# Patient Record
Sex: FEMALE | Race: WHITE | NOT HISPANIC OR LATINO | Employment: FULL TIME | URBAN - METROPOLITAN AREA
[De-identification: names, ages, dates, MRNs, and addresses within clinical notes are randomized per-mention and may not be internally consistent; named-entity substitution may affect disease eponyms.]

---

## 2017-12-01 ENCOUNTER — ALLSCRIPTS OFFICE VISIT (OUTPATIENT)
Dept: OTHER | Facility: OTHER | Age: 37
End: 2017-12-01

## 2017-12-02 ENCOUNTER — HOSPITAL ENCOUNTER (EMERGENCY)
Facility: HOSPITAL | Age: 37
Discharge: HOME | End: 2017-12-02
Attending: EMERGENCY MEDICINE | Admitting: EMERGENCY MEDICINE
Payer: COMMERCIAL

## 2017-12-02 ENCOUNTER — GENERIC CONVERSION - ENCOUNTER (OUTPATIENT)
Dept: OTHER | Facility: OTHER | Age: 37
End: 2017-12-02

## 2017-12-02 VITALS
WEIGHT: 187 LBS | RESPIRATION RATE: 16 BRPM | DIASTOLIC BLOOD PRESSURE: 72 MMHG | HEART RATE: 82 BPM | SYSTOLIC BLOOD PRESSURE: 124 MMHG | TEMPERATURE: 98.3 F | OXYGEN SATURATION: 97 %

## 2017-12-02 DIAGNOSIS — S39.011A STRAIN OF ABDOMINAL MUSCLE, INITIAL ENCOUNTER: Primary | ICD-10-CM

## 2017-12-02 LAB
ALBUMIN SERPL BCP-MCNC: 3.5 G/DL (ref 3.5–5)
ALP SERPL-CCNC: 72 U/L (ref 46–116)
ALT SERPL W P-5'-P-CCNC: 21 U/L (ref 12–78)
ANION GAP SERPL CALCULATED.3IONS-SCNC: 8 MMOL/L (ref 4–13)
AST SERPL W P-5'-P-CCNC: 18 U/L (ref 5–45)
BACTERIA UR QL AUTO: ABNORMAL /HPF
BASOPHILS # BLD AUTO: 0.03 THOUSANDS/ΜL (ref 0–0.1)
BASOPHILS NFR BLD AUTO: 0 % (ref 0–1)
BILIRUB SERPL-MCNC: 0.38 MG/DL (ref 0.2–1)
BILIRUB UR QL STRIP: NEGATIVE
BUN SERPL-MCNC: 11 MG/DL (ref 5–25)
CALCIUM SERPL-MCNC: 8.8 MG/DL (ref 8.3–10.1)
CHLORIDE SERPL-SCNC: 105 MMOL/L (ref 100–108)
CLARITY UR: CLEAR
CLARITY, POC: YELLOW
CO2 SERPL-SCNC: 28 MMOL/L (ref 21–32)
COLOR UR: YELLOW
COLOR, POC: CLEAR
CREAT SERPL-MCNC: 0.9 MG/DL (ref 0.6–1.3)
EOSINOPHIL # BLD AUTO: 0.18 THOUSAND/ΜL (ref 0–0.61)
EOSINOPHIL NFR BLD AUTO: 2 % (ref 0–6)
ERYTHROCYTE [DISTWIDTH] IN BLOOD BY AUTOMATED COUNT: 12.3 % (ref 11.6–15.1)
EXT PREG TEST URINE: NEGATIVE
GFR SERPL CREATININE-BSD FRML MDRD: 81 ML/MIN/1.73SQ M
GLUCOSE SERPL-MCNC: 87 MG/DL (ref 65–140)
GLUCOSE UR STRIP-MCNC: NEGATIVE MG/DL
HCT VFR BLD AUTO: 40.1 % (ref 34.8–46.1)
HGB BLD-MCNC: 13.8 G/DL (ref 11.5–15.4)
HGB UR QL STRIP.AUTO: ABNORMAL
KETONES UR STRIP-MCNC: NEGATIVE MG/DL
LEUKOCYTE ESTERASE UR QL STRIP: NEGATIVE
LIPASE SERPL-CCNC: 99 U/L (ref 73–393)
LYMPHOCYTES # BLD AUTO: 2.22 THOUSANDS/ΜL (ref 0.6–4.47)
LYMPHOCYTES NFR BLD AUTO: 24 % (ref 14–44)
MCH RBC QN AUTO: 31.1 PG (ref 26.8–34.3)
MCHC RBC AUTO-ENTMCNC: 34.4 G/DL (ref 31.4–37.4)
MCV RBC AUTO: 90 FL (ref 82–98)
MONOCYTES # BLD AUTO: 0.77 THOUSAND/ΜL (ref 0.17–1.22)
MONOCYTES NFR BLD AUTO: 8 % (ref 4–12)
NEUTROPHILS # BLD AUTO: 6.07 THOUSANDS/ΜL (ref 1.85–7.62)
NEUTS SEG NFR BLD AUTO: 66 % (ref 43–75)
NITRITE UR QL STRIP: NEGATIVE
NON-SQ EPI CELLS URNS QL MICRO: ABNORMAL /HPF
NRBC BLD AUTO-RTO: 0 /100 WBCS
PH UR STRIP.AUTO: 6 [PH] (ref 4.5–8)
PLATELET # BLD AUTO: 244 THOUSANDS/UL (ref 149–390)
PMV BLD AUTO: 10.6 FL (ref 8.9–12.7)
POTASSIUM SERPL-SCNC: 4.4 MMOL/L (ref 3.5–5.3)
PROT SERPL-MCNC: 7.1 G/DL (ref 6.4–8.2)
PROT UR STRIP-MCNC: ABNORMAL MG/DL
RBC # BLD AUTO: 4.44 MILLION/UL (ref 3.81–5.12)
RBC #/AREA URNS AUTO: ABNORMAL /HPF
SODIUM SERPL-SCNC: 141 MMOL/L (ref 136–145)
SP GR UR STRIP.AUTO: 1.02 (ref 1–1.03)
UROBILINOGEN UR QL STRIP.AUTO: 0.2 E.U./DL
WBC # BLD AUTO: 9.27 THOUSAND/UL (ref 4.31–10.16)
WBC #/AREA URNS AUTO: ABNORMAL /HPF

## 2017-12-02 PROCEDURE — 81001 URINALYSIS AUTO W/SCOPE: CPT

## 2017-12-02 PROCEDURE — 80053 COMPREHEN METABOLIC PANEL: CPT | Performed by: EMERGENCY MEDICINE

## 2017-12-02 PROCEDURE — 81002 URINALYSIS NONAUTO W/O SCOPE: CPT | Performed by: EMERGENCY MEDICINE

## 2017-12-02 PROCEDURE — 96372 THER/PROPH/DIAG INJ SC/IM: CPT

## 2017-12-02 PROCEDURE — 36415 COLL VENOUS BLD VENIPUNCTURE: CPT | Performed by: EMERGENCY MEDICINE

## 2017-12-02 PROCEDURE — 83690 ASSAY OF LIPASE: CPT | Performed by: EMERGENCY MEDICINE

## 2017-12-02 PROCEDURE — 85025 COMPLETE CBC W/AUTO DIFF WBC: CPT | Performed by: EMERGENCY MEDICINE

## 2017-12-02 PROCEDURE — 81025 URINE PREGNANCY TEST: CPT | Performed by: EMERGENCY MEDICINE

## 2017-12-02 PROCEDURE — 99284 EMERGENCY DEPT VISIT MOD MDM: CPT

## 2017-12-02 RX ORDER — AZITHROMYCIN 1 G
1 PACKET (EA) ORAL ONCE
COMMUNITY
End: 2018-02-26

## 2017-12-02 RX ORDER — KETOROLAC TROMETHAMINE 30 MG/ML
15 INJECTION, SOLUTION INTRAMUSCULAR; INTRAVENOUS ONCE
Status: COMPLETED | OUTPATIENT
Start: 2017-12-02 | End: 2017-12-02

## 2017-12-02 RX ADMIN — KETOROLAC TROMETHAMINE 15 MG: 30 INJECTION, SOLUTION INTRAMUSCULAR at 17:10

## 2017-12-02 NOTE — ED PROVIDER NOTES
History  Chief Complaint   Patient presents with    Abdominal Pain     Mid abdominal pain since this morning  Denies n/v/d/c, fever  History of umnilical hernia  11 months postpartum  HPI  18-year-old female who is about 11 months postpartum presenting for evaluation of abdominal pain  Patient says she woke up this morning and then she noted that she had some mid abdominal pain  She says that is a sharp pain which is intermittent, worse whenever she is turning certain way or sitting up  She denies any radiation of the pain, no alleviating factors except for when she is sitting very still  Patient says if she lays down or sits still, she has no pain  Patient denies any back pain  No nausea, no vomiting, no diarrhea, no dysuria, no vaginal discharge  Patient is currently on her menses started yesterday, does have some vaginal bleeding because of this  No prior history of abdominal pain similar to this  Patient did not have a , she had spontaneous vaginal delivery  She denies any fevers and chills  No medical problems, she does have a history of umbilical hernia that was repaired in   Last bowel movement was yesterday night, it was normal   Patient is eating and drinking just fine, no history of reflux disease  Patient does state that over the past week, she does have an upper respiratory infection with a cough  She denies smoking, drinking and drug use  Prior to Admission Medications   Prescriptions Last Dose Informant Patient Reported? Taking? azithromycin (ZITHROMAX) 1 g powder   Yes Yes   Sig: Take 1 packet by mouth once Started azithromycin pack yesterday  Facility-Administered Medications: None       History reviewed  No pertinent past medical history  Past Surgical History:   Procedure Laterality Date    CHOLECYSTECTOMY         History reviewed  No pertinent family history  I have reviewed and agree with the history as documented      Social History   Substance Use Topics    Smoking status: Never Smoker    Smokeless tobacco: Never Used    Alcohol use No        Review of Systems     Constitutional: Negative for appetite change, chills and fever  HENT: Negative for congestion, rhinorrhea and sore throat  Eyes: Negative for photophobia, pain and visual disturbance  Respiratory: Negative for cough, chest tightness and shortness of breath  Cardiovascular: Negative for chest pain, palpitations and leg swelling  Gastrointestinal:  Positive for abdominal pain, negative for diarrhea, nausea and vomiting  Genitourinary: Negative for dysuria, flank pain and hematuria  Musculoskeletal: Negative for back pain, neck pain and neck stiffness  Skin: Negative for color change, rash and wound  Neurological: Negative for dizziness, numbness and headaches  All other systems reviewed and are negative        Physical Exam  ED Triage Vitals [12/02/17 1550]   Temperature Pulse Respirations Blood Pressure SpO2   98 3 °F (36 8 °C) 85 16 135/86 97 %      Temp Source Heart Rate Source Patient Position - Orthostatic VS BP Location FiO2 (%)   Temporal Monitor Sitting Right arm --      Pain Score       6           Orthostatic Vital Signs  Vitals:    12/02/17 1550 12/02/17 1749   BP: 135/86 124/72   Pulse: 85 82   Patient Position - Orthostatic VS: Sitting Sitting       Physical Exam  /72   Pulse 82   Temp 98 3 °F (36 8 °C) (Temporal)   Resp 16   Wt 84 8 kg (187 lb)   LMP 12/01/2017   SpO2 97%     General Appearance:  Alert, cooperative, no distress, appears stated age   Head:  Normocephalic, without obvious abnormality, atraumatic   Eyes:  PERRL, conjunctiva/corneas clear, EOM's intact       Nose: Nares normal, septum midline,mucosa normal, no drainage or sinus tenderness   Throat: Lips, mucosa, and tongue normal; teeth and gums normal   Neck: Supple, symmetrical, trachea midline, no adenopathy   Back:   Symmetric, no curvature, ROM normal, no CVA tenderness   Lungs: Clear to auscultation bilaterally, respirations unlabored   Heart:  Regular rate and rhythm, S1 and S2 normal, no murmur, rub, or gallop   Abdomen:   Soft, there is the moderate tenderness in the mid abdomen along the rectus muscle, no guarding, no right or left upper quadrant tenderness no right or left lower quadrant tenderness, bowel sounds active all four quadrants   Pelvic: Deferred   Extremities: Extremities normal, atraumatic, no cyanosis or edema   Pulses: 2+ and symmetric   Skin: Skin color, texture, turgor normal, no rashes or lesions   Neurologic:      Psychiatric: Moves all extremities, sensation and strength in tact in all extremities    Normal mood and affect           ED Medications  Medications   ketorolac (TORADOL) 30 mg/mL injection 15 mg (15 mg Intramuscular Given 12/2/17 1710)       Diagnostic Studies  Results Reviewed     Procedure Component Value Units Date/Time    Comprehensive metabolic panel [[id number]] Collected:  12/02/17 1711    Lab Status:  Final result Specimen:  Blood from Arm, Right Updated:  12/02/17 1735     Sodium 141 mmol/L      Potassium 4 4 mmol/L      Chloride 105 mmol/L      CO2 28 mmol/L      Anion Gap 8 mmol/L      BUN 11 mg/dL      Creatinine 0 90 mg/dL      Glucose 87 mg/dL      Calcium 8 8 mg/dL      AST 18 U/L      ALT 21 U/L      Alkaline Phosphatase 72 U/L      Total Protein 7 1 g/dL      Albumin 3 5 g/dL      Total Bilirubin 0 38 mg/dL      eGFR 81 ml/min/1 73sq m     Narrative:         National Kidney Disease Education Program recommendations are as follows:  GFR calculation is accurate only with a steady state creatinine  Chronic Kidney disease less than 60 ml/min/1 73 sq  meters  Kidney failure less than 15 ml/min/1 73 sq  meters      Lipase [[id number]]  (Normal) Collected:  12/02/17 1711    Lab Status:  Final result Specimen:  Blood from Arm, Right Updated:  12/02/17 1735     Lipase 99 u/L     CBC and differential [[id number]]  (Normal) Collected:  12/02/17 1711    Lab Status:  Final result Specimen:  Blood from Arm, Right Updated:  12/02/17 1721     WBC 9 27 Thousand/uL      RBC 4 44 Million/uL      Hemoglobin 13 8 g/dL      Hematocrit 40 1 %      MCV 90 fL      MCH 31 1 pg      MCHC 34 4 g/dL      RDW 12 3 %      MPV 10 6 fL      Platelets 555 Thousands/uL      nRBC 0 /100 WBCs      Neutrophils Relative 66 %      Lymphocytes Relative 24 %      Monocytes Relative 8 %      Eosinophils Relative 2 %      Basophils Relative 0 %      Neutrophils Absolute 6 07 Thousands/µL      Lymphocytes Absolute 2 22 Thousands/µL      Monocytes Absolute 0 77 Thousand/µL      Eosinophils Absolute 0 18 Thousand/µL      Basophils Absolute 0 03 Thousands/µL     POCT pregnancy, urine [[id number]]  (Normal) Resulted:  12/02/17 1707    Lab Status:  Final result Updated:  12/02/17 1707     EXT PREG TEST UR (Ref: Negative) negative    Urine Microscopic [[id number]]  (Abnormal) Collected:  12/02/17 1622    Lab Status:  Final result Specimen:  Urine from Urine, Clean Catch Updated:  12/02/17 1643     RBC, UA Innumerable (A) /hpf      WBC, UA None Seen /hpf      Epithelial Cells Occasional /hpf      Bacteria, UA Occasional /hpf     POCT urinalysis dipstick [[id number]]  (Normal) Resulted:  12/02/17 1611    Lab Status:  Final result Updated:  12/02/17 1612     Color, UA clear     Clarity, UA yellow    ED Urine Macroscopic [[id number]]  (Abnormal) Collected:  12/02/17 1622    Lab Status:  Final result Specimen:  Urine Updated:  12/02/17 1606     Color, UA Yellow     Clarity, UA Clear     pH, UA 6 0     Leukocytes, UA Negative     Nitrite, UA Negative     Protein, UA 30 (1+) (A) mg/dl      Glucose, UA Negative mg/dl      Ketones, UA Negative mg/dl      Urobilinogen, UA 0 2 E U /dl      Bilirubin, UA Negative     Blood, UA Large (A)     Specific [place], UA 1 020    Narrative:       CLINITEK RESULT                 No orders to display         Procedures  Procedures      Phone Consults  ED Phone Contact    ED Course  ED Course as of Dec 03 2041   Sat Dec 02, 2017   1746 Patient feels about the same, appears comfortable in the bed  Labs and urine unremarkable  Advised patient to return to the emergency room if pain worsens in the next 12-24 hours  Advised taking Motrin/Tylenol scheduled until the pain resolves  Follow up with her prior surgeon Dr Mehreen Ferrell as well as her PCP  MDM   Patient abdominal pain likely related to a muscular strain of the abdominus rectus  We will get the abdominal labs to evaluate for pancreatitis, liver etiology  Will check a UA for UTI  The will treat with Toradol  Reassess  CritCare Time    Disposition  Final diagnoses:   Strain of abdominal muscle, initial encounter     Time reflects when diagnosis was documented in both MDM as applicable and the Disposition within this note     Time User Action Codes Description Comment    12/2/2017  5:48 PM Oly Cantu Add [S39 011A] Strain of abdominal muscle, initial encounter       ED Disposition     ED Disposition Condition Comment    Discharge  Madi Youngbloodpk discharge to home/self care  Condition at discharge: Stable        Follow-up Information     Follow up With Specialties Details Why [facility], DO Family Medicine Schedule an appointment as soon as possible for a visit in 3 days  Alta Cox [address] [place] [address]  [phone]      Your surgeon, Dr Summer Solo an appointment as soon as possible for a visit in 3 days          Discharge Medication List as of 12/2/2017  5:50 PM      CONTINUE these medications which have NOT CHANGED    Details   azithromycin (ZITHROMAX) 1 g powder Take 1 packet by mouth once Started azithromycin pack yesterday , Historical Med           No discharge procedures on file  ED Provider  Attending physically available and evaluated Madi Hubbard I managed the patient along with the ED Attending      Electronically Signed by         Tatiana Gracia MD  Resident  12/03/17 2041

## 2017-12-02 NOTE — ED ATTENDING ATTESTATION
IClancy DO Kailey, saw and evaluated the patient  I have discussed the patient with the resident/non-physician practitioner and agree with the resident's/non-physician practitioner's findings, Plan of Care, and MDM as documented in the resident's/non-physician practitioner's note, except where noted  All available labs and Radiology studies were reviewed  At this point I agree with the current assessment done in the Emergency Department  I have conducted an independent evaluation of this patient a history and physical is as follows:      Critical Care Time  Unknown [place] female presents to the emergency department with a 1 day history of soreness around the periumbilical region at the site of her previous umbilical hernia repair 2 years ago  She states the pain is worse with movement and better if she lays down  She has not noticed any swelling of the area  No fevers or chills, nausea, vomiting or diarrhea  She states that she has been coughing a lot secondary to an upper respiratory illness  On exam she is awake and alert and in no distress  Heart is regular without murmur  Lungs are clear  Abdomen is obese, soft with tenderness around the periumbilical region with no palpable hernia  No rebound or guarding  Bowel sounds are normal  Skin is warm and dry, normal color no rash

## 2017-12-02 NOTE — DISCHARGE INSTRUCTIONS
Muscle Strain   WHAT YOU NEED TO KNOW:   What is a muscle strain? A muscle strain is a twist, pull, or tear of a muscle or tendon  A tendon is a strong elastic tissue that connects a muscle to a bone  What causes a muscle strain? Stretching a muscle too much may cause a muscle strain  A strain may also happen when a muscle is used too much without rest  Leg muscle strains are more common in people who play sports, run, dance, and water-ski  Strains in the muscles of the abdomen may happen when you play volleyball, tennis, golf, or baseball and when you dive  Low back muscle strains may occur when you lift heavy objects or when you wrestle or do gymnastics  What are the types of muscle strains? · A mild strain  is also called a first-degree strain  It is a tear of a few muscle fibers with little swelling  You may have very little or no loss of muscle strength  · A moderate strain  is also called a second-degree strain  There is more damage to your muscle or tendon, and it is weaker than it was before the injury  · A severe strain  is also called a third-degree strain  This tear goes along the whole length of the muscle, and you are unable to use the muscle at all  What increases my risk of a muscle strain? · Older age    · Muscle fatigue (tiredness)    · Not warming up before exercise    · Past muscle injury, or going back to your usual activity before your injury has healed    · Stiff, tight, and weak muscles    · Training longer or farther than your usual time or distance     · Problems with your feet, or your legs being different lengths  What are the signs and symptoms of a muscle strain? The signs and symptoms of a muscle strain depend on how badly your muscle is injured  The signs and symptoms may or may not show up right away when the injury happens   You may have one or more of the following:  · Bruised skin on the area of your injured muscle    · Muscle soreness, cramps, or spasms    · Little or stiff muscle movement, or loss of muscle strength    · Swelling in the area of the injury    · Muscle pain that gets worse with activity, or pain that moves or spreads to another body area    · Crepitus (crackling sound or grating feeling) when you move your muscle  How is a muscle strain diagnosed? Your healthcare provider will ask about diseases or injuries you have had in the past  He may touch and press parts of your muscle  He may bend, stretch, or move your joint certain ways  You may also have any of the following tests:  · X-ray: This is a picture of the bones and tissues in your body  X-rays may be done to make sure that you did not break a bone when your muscle strain happened  · Magnetic resonance imaging: This test is also called MRI  During the MRI, pictures of your muscles are taken  An MRI may be used to check for tears or other muscle injuries  It may also be used to look at your joints, bones, or blood vessels  You may be given dye through your vein before the pictures are taken  This helps your body parts show up better  Tell your healthcare provider if you are allergic to shellfish (lobster, crab, or shrimp)  People who are allergic to shellfish may also be allergic to some dyes  · Computed tomography scan: This is also called CT scan  A x-ray machine uses a computer to take pictures of your arms, legs, back, or abdomen  It is used to check for muscle injuries, broken bones, and damaged blood vessels  · Ultrasound: An ultrasound uses sound waves to show pictures of your muscles and tissues on a monitor  What can I do to help a muscle strain heal?   · 3 to 7 days after the injury:  Use Rest, Ice, Compression, and Elevation (RICE) to help stop bruising and decrease pain and swelling  ¨ Rest:  Rest your muscle to allow your injury to heal  When the pain decreases, begin normal, slow movements   For mild and moderate muscle strains, you should rest your muscles for about 2 days  However, if you have a severe muscle strain, you should rest for 10 to 14 days  You may need to use crutches to walk if your muscle strain is in your legs or lower body  ¨ Ice:  Put an ice pack on the injured area  Put a towel between the ice pack and your skin  Do not put the ice pack directly on your skin  You can use a package of frozen peas instead of an ice pack  ¨ Compression:  You may need to wrap an elastic bandage around the area to decrease swelling  It should be tight enough for you to feel support  Do not wrap it too tightly  ¨ Elevation:  Keep the injured muscle raised above your heart if possible  For example, if you have a strain of your lower leg muscle, lie down and prop your leg up on pillows  This helps decrease pain and swelling  · 3 to 21 days after your injury:  Start to slowly and regularly exercise your strained muscle  This will help it heal  If you feel pain, decrease how hard you are exercising  · 1 to 6 weeks after your injury:  Stretch the injured muscle  Stretch the muscle for about 30 seconds  Do this 4 times a day  You may stretch the muscle until you feel a slight pull  Stop stretching if you feel pain  · 2 weeks to 6 months after your injury:  The goal of this phase is to return to the activity you were doing before the injury without hurting the muscle again  · 3 weeks to 6 months after your injury:  Keep stretching and strengthening your muscles to avoid injury  Slowly increase the time and distance that you exercise  You may still have signs and symptoms of muscle strain 6 months after the injury, even if you do things to help it heal  In this case, you may need surgery on the muscle  How is a muscle strain treated? · Medicines:      ¨ NSAIDs:  This medicine is used to decrease pain and inflammation  It can be bought without a doctor's order  NSAIDs can cause stomach bleeding or kidney problems if they are not taken correctly   Always read the medicine label and follow the directions on it before you use this medicine  You should only use this medicine for 3 to 7 days after the injury happened  ¨ Muscle relaxers  help decrease pain and muscle spasms  ¨ Steroid medicines: Your healthcare provider may recommend a steroid injection to decrease pain and inflammation  ¨ Local anesthetic:  This can be used to numb the are for a short time  This is often used if you have a muscle strain in your back  · Physical therapy:  A physical therapist teaches you exercises to help improve movement and strength, and to decrease pain  · Surgery:  healthcare providers may recommend surgery if your muscle strain does not heal after 6 months of treatments  Surgery may be done to drain blood that has pooled in your muscle  If your tendon was torn off of the bone, it may be put back with surgery  How can a muscle strain be prevented? · Always wear proper shoes when you play sports:  Replace your old running shoes with new ones often if you are a runner  Use special shoe inserts or arch supports to correct leg or foot problems  Ask your healthcare provider for more information on shoe supports  · Do warm up and cool down exercises:  Do stretching exercises before you work out or do sports activities  These exercises will help loosen and decrease stress on your muscles  Cool down and stretch after your workout  Do not stop and rest after a workout without cooling down first            · Keep your muscles strong with strength training exercises:  Exercises such as weight lifting and stretching exercises help keep your muscles flexible and strong  A physical therapist or  may help you with these exercises  · Slowly start your exercise or sports training program:  Follow your healthcare provider's advice on when to start exercising  Slowly increase time, distance, and how often you train   Sudden increases in how often you train may cause you to injure your muscle again  When should I call my healthcare provider? · Your pain and swelling worsen or do not go away  · You have questions or concerns about your care or treatment  When should I seek immediate care? · You suddenly cannot feel or move your injured muscle  CARE AGREEMENT:   You have the right to help plan your care  Learn about your health condition and how it may be treated  Discuss treatment options with your caregivers to decide what care you want to receive  You always have the right to refuse treatment  The above information is an  only  It is not intended as medical advice for individual conditions or treatments  Talk to your doctor, nurse or pharmacist before following any medical regimen to see if it is safe and effective for you  © 2017 [employer]  Information is for End User's use only and may not be sold, redistributed or otherwise used for commercial purposes  All illustrations and images included in CareNotes® are the copyrighted property of [employer]  or [employer]

## 2017-12-05 ENCOUNTER — GENERIC CONVERSION - ENCOUNTER (OUTPATIENT)
Dept: OTHER | Facility: OTHER | Age: 37
End: 2017-12-05

## 2017-12-05 NOTE — PROGRESS NOTES
Assessment    1  Acute sinusitis, recurrence not specified, unspecified location (461 9) (J01 90)   2  Otitis media (382 9) (H66 90)   3  Cough (786 2) (R05)   4  Eustachian tube dysfunction (381 81) (H69 80)   5  Breast feeding status of mother (V24 1) (Z39 1)   6  Allergic rhinitis (477 9) (J30 9)    Plan  Acute sinusitis, recurrence not specified, unspecified location, Allergic rhinitis, Breastfeeding status of mother, Cough, Eustachian tube dysfunction, Otitis media    · Drink plenty of fluids ; Status:Complete;   Done: 46XSE8519 11:11AM   · Gargle with warm salt water for 5 minutes every 4 hours ; Status:Complete;   Done:51Fwq0144 11:11AM   · Follow-up PRN Evaluation and Treatment  Follow-up  Status: Complete  Done:01Dec2017 11:11AM  Acute sinusitis, recurrence not specified, unspecified location, Otitis media    · DrRx Zithromax Z-Abdoulaye 250 MG #6 pill pack; Sig   2 stat then one daily  Allergic rhinitis, Eustachian tube dysfunction    · Rhinocort Allergy 32 MCG/ACT Nasal Suspension; instill 1 spray into each nostrilonce daily    Discussion/Summary    1  Allergic rhinitis 2  Eustachian tube dysfunction, Rhinocort was ordered, category B for pregnancy so safe in breast-feedingAcute sinusitis 4  Otitis media, Zithromax was ordered category B for pregnancy, safe for breast-feedingCough  Patient use over-the-counter Robitussin, safe for breast-feeding increased p o  fluidsBreast feeding infant, 6month-oldPatient to return in 1 week if still symptoms  Possible side effects of new medications were reviewed with the patient/guardian today  The treatment plan was reviewed with the patient/guardian  The patient/guardian understands and agrees with the treatment plan     Self Referrals: No      Chief Complaint  nasal and chest congestion since 11-23-17 productive cough with thick greenish mucus   no fever      History of Present Illness  HPI: The past week patient has had increasing sinus pain and pressure otalgia sneezing for the rhinorrhea postnasal drip scratchy throat mild mildly harsh productive cough with yellow-green sputum  No fever chills night sweats patient denies any chest patient's with wheeze or hemoptysis no medication has been taken patient is breastfeeding      Review of Systems   Constitutional: as noted in HPI   ENT: as noted in HPI  Cardiovascular: no complaints of slow or fast heart rate, no chest pain, no palpitations, no leg claudication or lower extremity edema  Respiratory: as noted in HPI  Breasts: no complaints of breast pain, breast lump or nipple discharge  Gastrointestinal: no complaints of abdominal pain, no constipation, no nausea or diarrhea, no vomiting, no bloody stools  Genitourinary: no complaints of dysuria, no incontinence, no pelvic pain, no dysmenorrhea, no vaginal discharge or abnormal vaginal bleeding  Musculoskeletal: no complaints of arthralgia, no myalgia, no joint swelling or stiffness, no limb pain or swelling  Integumentary: no complaints of skin rash or lesion, no itching or dry skin, no skin wounds  Neurological: no complaints of headache, no confusion, no numbness or tingling, no dizziness or fainting  Active Problems  1  Allergic rhinitis (477 9) (J30 9)    Past Medical History  1  History of 14 weeks gestation of pregnancy (V22 2) (Z3A 14)   2  History of Biliary dyskinesia (575 8) (K82 8)   3  History of Cellulitis (682 9) (L03 90)   4  History of Gallbladder disease (575 9) (K82 9)   5  History of sinusitis (V12 69) (Z87 09)  Active Problems And Past Medical History Reviewed: The active problems and past medical history were reviewed and updated today  Family History  Mother    1  Family history of Arthritis (V17 7)   2  Family history of Diabetes Mellitus (V18 0)  Father    3  Family history of Acute Myocardial Infarction (V17 3)   4  Family history of Alcoholism   5  Family history of Coronary Artery Disease (V17 49)   6   Family history of Heart Disease (V17 49)  Family History Reviewed: The family history was reviewed and updated today  Social History   · Being A Social Drinker   · Employed   · Marital History - Currently    · Never A Smoker  The social history was reviewed and updated today  Surgical History    1  History of Cholecystectomy Laparoscopic   2  History of Oral Surgery Tooth Extraction  Surgical History Reviewed: The surgical history was reviewed and updated today  Current Meds   1  Azithromycin 250 MG Oral Tablet; TAKE 2 TABLETS ON DAY 1 THEN TAKE 1 TABLET A DAY FOR 4 DAYS; Therapy: [id number] to (Last Rx:22Oct2016) Ordered   2  Prenatal Vitamin TABS; Therapy: ([facility]) to Recorded    The medication list was reviewed and updated today  Allergies  1  Penicillins    Vitals   Recorded: 21HKL3856 10:59AM   Temperature 98 2 F   Heart Rate 72   Respiration 16   Systolic 189   Diastolic 70   Height 5 ft 5 in   Weight 187 lb 6 08 oz   BMI Calculated 31 18   BSA Calculated 1 93       Physical Exam   Constitutional  General appearance: No acute distress, well appearing and well nourished  Eyes  Conjunctiva and lids: No swelling, erythema or discharge  Ears, Nose, Mouth, and Throat  External inspection of ears and nose: Normal    Otoscopic examination: Abnormal  -- Bilateral tympanic membranes red and dull  Nasal mucosa, septum, and turbinates: Abnormal  -- Positive allergic turbinates positive pansinus tenderness to percussion  Oropharynx: Abnormal  -- Purulent postnasal drip minor injection negative exudate  Pulmonary  Respiratory effort: No increased work of breathing or signs of respiratory distress  Auscultation of lungs: Clear to auscultation  Cardiovascular  Palpation of heart: Normal PMI, no thrills  Auscultation of heart: Normal rate and rhythm, normal S1 and S2, without murmurs  Lymphatic  Palpation of lymph nodes in neck: Abnormal  -- Positive shotty anterior nodes    Musculoskeletal Gait and station: Normal    Skin Warm and dry  Neurologic Negative Gross focal motor deficit    Psychiatric  Mood and affect: Normal          Signatures   Electronically signed by : Evelio Robison DO; Dec  1 2017 11:14AM EST                       (Author)

## 2017-12-11 DIAGNOSIS — R31.29 OTHER MICROSCOPIC HEMATURIA: ICD-10-CM

## 2017-12-31 ENCOUNTER — OFFICE VISIT (OUTPATIENT)
Dept: URGENT CARE | Facility: MEDICAL CENTER | Age: 37
End: 2017-12-31
Payer: COMMERCIAL

## 2017-12-31 PROCEDURE — 99212 OFFICE O/P EST SF 10 MIN: CPT

## 2018-01-04 NOTE — PROGRESS NOTES
Assessment   1  Acute right otitis media (382 9) (H66 91)    Plan   Acute right otitis media    · Azithromycin 250 MG Oral Tablet (Zithromax Z-Abdoulaye); TAKE 2 TABLETS ON DAY 1    THEN TAKE 1 TABLET A DAY FOR 4 DAYS   · Bromfed DM 30-2-10 MG/5ML Oral Syrup; TAKE 5 ML EVERY 4 TO 6 HOURS AS    NEEDED    Discussion/Summary   Discussion Summary:    Increase fluids, finish all anabiotic  Follow-up if symptoms increase or no better in 5 days  Medication Side Effects Reviewed: Possible side effects of new medications were reviewed with the patient/guardian today  Understands and agrees with treatment plan: The treatment plan was reviewed with the patient/guardian  The patient/guardian understands and agrees with the treatment plan    Counseling Documentation With Imm: The patient was counseled regarding instructions for management,-- prognosis,-- patient and family education,-- impressions  Chief Complaint   1  Cold Symptoms  Chief Complaint Free Text Note Form: Pt with complaints of nasal congestion, sinus pressure and ear pain since 12/23  History of Present Illness   HPI: Patient with two-day history of increasing sinus and right ear discomfort  Hospital Based Practices Required Assessment:      Pain Assessment      the patient states they do not have pain  (on a scale of 0 to 10, the patient rates the pain at 0 )     Cold Symptoms: Eleanor Najjar presents with complaints of cold symptoms  Associated symptoms include nasal congestion,-- runny nose,-- post nasal drainage,-- scratchy throat,-- dry cough,-- facial pressure,-- facial pain,-- ear pain,-- fatigue,-- fever-- and-- chills  Review of Systems   ROS Reviewed:    ROS reviewed  Active Problems   1  Abdominal pain (789 00) (R10 9)   2  Acute sinusitis, recurrence not specified, unspecified location (461 9) (J01 90)   3  Allergic rhinitis (477 9) (J30 9)   4  Breast feeding status of mother (V24 1) (Z39 1)   5  Cough (786 2) (R05)   6  Eustachian tube dysfunction (381 81) (H69 80)   7  Hematuria, microscopic (599 72) (R31 29)   8  Otitis media (382 9) (H66 90)    Past Medical History   1  History of 14 weeks gestation of pregnancy (V22 2) (Z3A 14)   2  History of Biliary dyskinesia (575 8) (K82 8)   3  History of Cellulitis (682 9) (L03 90)   4  History of Gallbladder disease (575 9) (K82 9)   5  History of sinusitis (V12 69) (Z87 09)    Family History   Mother    1  Family history of Arthritis (V17 7)   2  Family history of Diabetes Mellitus (V18 0)  Father    3  Family history of Acute Myocardial Infarction (V17 3)   4  Family history of Alcoholism   5  Family history of Coronary Artery Disease (V17 49)   6  Family history of Heart Disease (V17 49)    Social History    · Being A Social Drinker   · Employed   · Marital History - Currently    · Never A Smoker    Surgical History   1  History of Cholecystectomy Laparoscopic   2  History of Oral Surgery Tooth Extraction    Current Meds    1  No Reported Medications Recorded    Allergies   1  Penicillins    Vitals   Signs   Recorded: 21Eqh1058 04:00PM   Temperature: 98 7 F  Heart Rate: 80  Respiration: 16  Systolic: 920  Diastolic: 70  O2 Saturation: 99  Pain Scale: 0    Physical Exam        Constitutional      General appearance: No acute distress, well appearing and well nourished  Ears, Nose, Mouth, and Throat      External inspection of ears and nose: Normal        Otoscopic examination: Abnormal   The right tympanic membrane was red-- and-- was bulging  The left tympanic membrane was bulging  The right external canal was normal  The left external canal was normal       Nasal mucosa, septum, and turbinates: Normal without edema or erythema  Oropharynx: Normal with no erythema, edema, exudate or lesions  Pulmonary      Respiratory effort: No increased work of breathing or signs of respiratory distress  Auscultation of lungs: Clear to auscultation  Cardiovascular      Auscultation of heart: Normal rate and rhythm, normal S1 and S2, without murmurs  Lymphatic      Palpation of lymph nodes in neck: No lymphadenopathy         Signatures    Electronically signed by : Ninfa Cabrera, [facility]; Dec 31 2017  4:20PM EST                       (Author)     Electronically signed by : SHUN Degroot ; Danny  3 2018  9:19AM EST                       (Co-author)

## 2018-01-23 VITALS
TEMPERATURE: 98.2 F | HEART RATE: 72 BPM | SYSTOLIC BLOOD PRESSURE: 110 MMHG | WEIGHT: 187.38 LBS | DIASTOLIC BLOOD PRESSURE: 70 MMHG | HEIGHT: 65 IN | BODY MASS INDEX: 31.22 KG/M2 | RESPIRATION RATE: 16 BRPM

## 2018-01-23 VITALS
SYSTOLIC BLOOD PRESSURE: 122 MMHG | OXYGEN SATURATION: 99 % | HEART RATE: 80 BPM | TEMPERATURE: 98.7 F | DIASTOLIC BLOOD PRESSURE: 70 MMHG | RESPIRATION RATE: 16 BRPM

## 2018-01-23 NOTE — MISCELLANEOUS
Message  Message Free Text Note Form: pt called with severe abd pain   referred to [facility] er for eval      Plan    1  *1 -  EMERGENCY DEPT [facility]  *  Status: Hold For -   Scheduling  Requested for: [id number]  Care Summary provided  : Yes    Signatures   Electronically signed by : Wing Saurabh DO; Dec  2 2017 11:22PM EST                       (Author)

## 2018-01-24 VITALS
WEIGHT: 187.6 LBS | BODY MASS INDEX: 31.25 KG/M2 | SYSTOLIC BLOOD PRESSURE: 120 MMHG | HEIGHT: 65 IN | HEART RATE: 68 BPM | DIASTOLIC BLOOD PRESSURE: 80 MMHG

## 2018-02-26 ENCOUNTER — OFFICE VISIT (OUTPATIENT)
Dept: FAMILY MEDICINE CLINIC | Facility: CLINIC | Age: 38
End: 2018-02-26
Payer: COMMERCIAL

## 2018-02-26 VITALS
RESPIRATION RATE: 20 BRPM | BODY MASS INDEX: 29.99 KG/M2 | TEMPERATURE: 100.8 F | DIASTOLIC BLOOD PRESSURE: 66 MMHG | HEIGHT: 65 IN | WEIGHT: 180 LBS | SYSTOLIC BLOOD PRESSURE: 102 MMHG | HEART RATE: 88 BPM

## 2018-02-26 DIAGNOSIS — J30.9 ALLERGIC RHINITIS, UNSPECIFIED CHRONICITY, UNSPECIFIED SEASONALITY, UNSPECIFIED TRIGGER: Primary | ICD-10-CM

## 2018-02-26 DIAGNOSIS — H65.00 ACUTE SEROUS OTITIS MEDIA, RECURRENCE NOT SPECIFIED, UNSPECIFIED LATERALITY: ICD-10-CM

## 2018-02-26 DIAGNOSIS — H69.80 DYSFUNCTION OF EUSTACHIAN TUBE, UNSPECIFIED LATERALITY: ICD-10-CM

## 2018-02-26 DIAGNOSIS — J01.40 ACUTE PANSINUSITIS, RECURRENCE NOT SPECIFIED: ICD-10-CM

## 2018-02-26 PROBLEM — R31.29 HEMATURIA, MICROSCOPIC: Status: ACTIVE | Noted: 2017-12-05

## 2018-02-26 PROCEDURE — 99214 OFFICE O/P EST MOD 30 MIN: CPT | Performed by: FAMILY MEDICINE

## 2018-02-26 PROCEDURE — 3008F BODY MASS INDEX DOCD: CPT | Performed by: FAMILY MEDICINE

## 2018-02-26 RX ORDER — AZITHROMYCIN 250 MG/1
TABLET, FILM COATED ORAL
Qty: 6 TABLET | Refills: 0 | Status: SHIPPED | OUTPATIENT
Start: 2018-02-26 | End: 2018-03-03

## 2018-02-26 RX ORDER — MONTELUKAST SODIUM 10 MG/1
TABLET ORAL
Qty: 30 TABLET | Refills: 0 | Status: SHIPPED | OUTPATIENT
Start: 2018-02-26 | End: 2018-03-24 | Stop reason: SDUPTHER

## 2018-02-26 NOTE — PROGRESS NOTES
Assessment/Plan:  1  Acute sinusitis, Z-Abdoulaye was prescribed  2  Allergic rhinitis, Rhinocort and singular were prescribed  3  Eustachian tube dysfunction, Rhinocort singular  4  Serous otitis media, Rhinocort singular  5  Breast feeding, Zithromax Rhinocort and singular category B for pregnancy would be safe for breast-feeding  6  Per return to office in 1 week if still symptoms    Allergic rhinitis  Will start Rhinocort nasal spray and Singulair in light    Eustachian tube dysfunction  Rhinocort singular prescribed as above       Diagnoses and all orders for this visit:    Allergic rhinitis, unspecified chronicity, unspecified seasonality, unspecified trigger  -     montelukast (SINGULAIR) 10 mg tablet; Take 1 tablet daily for allergy  -     budesonide (RINOCORT AQUA) 32 MCG/ACT nasal spray; 1 spray into each nostril daily    Dysfunction of Eustachian tube, unspecified laterality  -     montelukast (SINGULAIR) 10 mg tablet; Take 1 tablet daily for allergy    Acute pansinusitis, recurrence not specified  -     azithromycin (ZITHROMAX) 250 mg tablet; Take 2 tablets today then 1 tablet daily till finished    Acute serous otitis media, recurrence not specified, unspecified laterality  -     montelukast (SINGULAIR) 10 mg tablet; Take 1 tablet daily for allergy    Breast feeding status of mother          Subjective:      Patient ID: Boo Lin is a 44 y o  female  Cc: sore throat, congestion, body aches, headache and fever x 3 days  Tyra Lark    For the past 3 days patient is having sinus pain and pressure headache sneezing PRA postnasal drip sore throat    No medication has been taken patient is breast feeding a positive fever chills no night sweats no chest pain shortness breath wheeze hemoptysis no cough no myalgias        The following portions of the patient's history were reviewed and updated as appropriate: allergies, current medications, past family history, past medical history, past social history, past surgical history and problem list     Review of Systems   Constitutional:        HPI   HENT:        HPI   Eyes: Negative  Respiratory: Negative  Cardiovascular: Negative  Gastrointestinal: Negative  Endocrine: Negative  Genitourinary: Negative  Musculoskeletal: Negative  Skin: Negative  Allergic/Immunologic: Negative  Neurological: Negative  Hematological: Negative  Psychiatric/Behavioral: Negative  Objective:      Vitals:    02/26/18 1227   BP: 102/66   BP Location: Left arm   Patient Position: Sitting   Cuff Size: Standard   Pulse: 88   Resp: 20   Temp: (!) 100 8 °F (38 2 °C)   TempSrc: Tympanic   Weight: 81 6 kg (180 lb)   Height: 5' 5" (1 651 m)            Physical Exam   Constitutional: She is oriented to person, place, and time  She appears well-developed and well-nourished  HENT:   Head: Normocephalic and atraumatic  Both tympanic membranes dull with fluid no injection positive allergic turbinates positive pansinus tenderness to percussion positive purulent postnasal drip minimal pharyngeal injection negative exudate positive shotty anterior cervical nodes   Eyes: Conjunctivae are normal    Neck: Neck supple  Cardiovascular: Normal rate, regular rhythm and normal heart sounds  Pulmonary/Chest: Effort normal and breath sounds normal    Abdominal: Soft  Musculoskeletal: She exhibits no edema  Lymphadenopathy:     She has cervical adenopathy  Neurological: She is alert and oriented to person, place, and time  No cranial nerve deficit  Skin: Skin is warm and dry  Psychiatric: She has a normal mood and affect

## 2018-03-09 ENCOUNTER — OFFICE VISIT (OUTPATIENT)
Dept: FAMILY MEDICINE CLINIC | Facility: CLINIC | Age: 38
End: 2018-03-09
Payer: COMMERCIAL

## 2018-03-09 VITALS
WEIGHT: 178 LBS | TEMPERATURE: 98.3 F | HEART RATE: 84 BPM | BODY MASS INDEX: 29.62 KG/M2 | SYSTOLIC BLOOD PRESSURE: 118 MMHG | DIASTOLIC BLOOD PRESSURE: 80 MMHG

## 2018-03-09 DIAGNOSIS — H69.80 DYSFUNCTION OF EUSTACHIAN TUBE, UNSPECIFIED LATERALITY: Primary | ICD-10-CM

## 2018-03-09 DIAGNOSIS — J30.9 ALLERGIC RHINITIS, UNSPECIFIED CHRONICITY, UNSPECIFIED SEASONALITY, UNSPECIFIED TRIGGER: ICD-10-CM

## 2018-03-09 PROCEDURE — 99213 OFFICE O/P EST LOW 20 MIN: CPT | Performed by: FAMILY MEDICINE

## 2018-03-09 NOTE — ASSESSMENT & PLAN NOTE
The patient was started on Coricidin HBP P/cold and flu 1 tablet twice a day because she is still breast-feeding but only at night and not every night

## 2018-03-09 NOTE — PROGRESS NOTES
Assessment/Plan:    No problem-specific Assessment & Plan notes found for this encounter  There are no diagnoses linked to this encounter  Subjective:      Patient ID: Mell Peterson is a 44 y o  female  CC:  Follow sinus /ear infection  Pt completed course of antibiotic 1 week ago, but ears are still blocked  - [facility]    HPI:  This is a 27-year-old female who comes in for a recheck following a sinusitis, eustachian tube dysfunction visit approximately 1 week ago  At the time she was given Zithromax with seems to have gotten her over her infection because she is much better today and she does not have a fever where as she had a fever last week  She was also given Rhinocort and Singulair because she is breast feeding  The amount of breast-feeding is only at bedtime and it is not every night  Therefore Coricidin will be added at twice a day  The following portions of the patient's history were reviewed and updated as appropriate: allergies, current medications, past family history, past medical history, past social history, past surgical history and problem list     Review of Systems   Constitutional: Negative for chills, fatigue and fever  HENT: Positive for congestion, ear pain, postnasal drip and rhinorrhea  Negative for sinus pressure and sore throat  Respiratory: Negative for cough  There were no vitals filed for this visit  Objective: There were no vitals taken for this visit  Physical Exam   Constitutional: She is oriented to person, place, and time  She appears well-developed and well-nourished  HENT:   Head: Normocephalic  Left Ear: External ear normal    Mouth/Throat: Oropharynx is clear and moist    Right tympanic membrane dull with no erythema or injection  Positive postnasal drip, no erythema of posterior pharynx and no exudates   Eyes: Conjunctivae and EOM are normal  Pupils are equal, round, and reactive to light  Neck: Normal range of motion  Neck supple  Cardiovascular: Normal rate, regular rhythm and normal heart sounds  Pulmonary/Chest: Effort normal and breath sounds normal    Abdominal: Soft  Bowel sounds are normal    Musculoskeletal: Normal range of motion  Neurological: She is alert and oriented to person, place, and time  Skin: Skin is warm  Psychiatric: She has a normal mood and affect  Her behavior is normal  Judgment and thought content normal    Nursing note and vitals reviewed

## 2018-03-24 DIAGNOSIS — H69.80 DYSFUNCTION OF EUSTACHIAN TUBE, UNSPECIFIED LATERALITY: ICD-10-CM

## 2018-03-24 DIAGNOSIS — J30.9 ALLERGIC RHINITIS, UNSPECIFIED CHRONICITY, UNSPECIFIED SEASONALITY, UNSPECIFIED TRIGGER: ICD-10-CM

## 2018-03-24 DIAGNOSIS — H65.00 ACUTE SEROUS OTITIS MEDIA, RECURRENCE NOT SPECIFIED, UNSPECIFIED LATERALITY: ICD-10-CM

## 2018-03-24 RX ORDER — MONTELUKAST SODIUM 10 MG/1
TABLET ORAL
Qty: 30 TABLET | Refills: 0 | Status: SHIPPED | OUTPATIENT
Start: 2018-03-24 | End: 2020-02-20 | Stop reason: ALTCHOICE

## 2019-03-02 ENCOUNTER — OFFICE VISIT (OUTPATIENT)
Dept: URGENT CARE | Facility: MEDICAL CENTER | Age: 39
End: 2019-03-02
Payer: COMMERCIAL

## 2019-03-02 VITALS
RESPIRATION RATE: 20 BRPM | WEIGHT: 174 LBS | HEART RATE: 81 BPM | OXYGEN SATURATION: 97 % | SYSTOLIC BLOOD PRESSURE: 129 MMHG | DIASTOLIC BLOOD PRESSURE: 87 MMHG | TEMPERATURE: 97.9 F | BODY MASS INDEX: 28.99 KG/M2 | HEIGHT: 65 IN

## 2019-03-02 DIAGNOSIS — J01.00 ACUTE MAXILLARY SINUSITIS, RECURRENCE NOT SPECIFIED: Primary | ICD-10-CM

## 2019-03-02 PROCEDURE — 99213 OFFICE O/P EST LOW 20 MIN: CPT | Performed by: FAMILY MEDICINE

## 2019-03-02 RX ORDER — AZITHROMYCIN 250 MG/1
TABLET, FILM COATED ORAL
Qty: 6 TABLET | Refills: 0 | Status: SHIPPED | OUTPATIENT
Start: 2019-03-02 | End: 2019-03-06

## 2019-03-02 NOTE — PATIENT INSTRUCTIONS
I prescribed Zithromax Z-Abdoulaye for 5 days  Advised patient to resume Flonase nasal spray she has at home 2 sprays in each nostril once daily, may continue with Sudafed  Also recommended saline irrigation it between  She expressed understanding  Rhinosinusitis   WHAT YOU NEED TO KNOW:   Rhinosinusitis (RS) is inflammation of your nose and sinuses  It commonly begins as a virus, often as a common cold  Viruses usually last 7 to 10 days and do not need treatment  When the virus does not get better on its own, you may have bacterial RS  This means that bacteria have begun to grow inside your sinuses  Acute RS lasts less than 4 weeks  Chronic RS lasts 12 weeks or more  Recurrent RS is when you have 4 or more episodes of RS in one year  DISCHARGE INSTRUCTIONS:   Return to the emergency department if:   · Your eye and eyelid are red, swollen, and painful  · You cannot open your eye  · You have double vision or you cannot see  · Your eyeball bulges out or you cannot move your eye  · You are more sleepy than normal or you notice changes in your ability to think, move, or talk  · You have a stiff neck, a fever, or a bad headache  · You have swelling of your forehead or scalp  Contact your healthcare provider if:   · Your symptoms are worse or do not improve after 3 to 5 days of treatment  · You have questions or concerns about your condition or care  Medicines: You may need any of the following:  · Acetaminophen  decreases pain and fever  It is available without a doctor's order  Ask how much to take and how often to take it  Follow directions  Acetaminophen can cause liver damage if not taken correctly  · NSAIDs , such as ibuprofen, help decrease swelling, pain, and fever  This medicine is available with or without a doctor's order  NSAIDs can cause stomach bleeding or kidney problems in certain people   If you take blood thinner medicine, always ask your healthcare provider if NSAIDs are safe for you  Always read the medicine label and follow directions  · Nasal steroid sprays  decrease inflammation in your nose and sinuses  · Decongestants  reduce swelling and drain mucus in the nose and sinuses  They may help you breathe easier  · Antihistamines  dry mucus in the nose and relieve sneezing  · Antibiotics  treat a bacterial infection and may be needed if your symptoms do not improve or they get worse  · Take your medicine as directed  Contact your healthcare provider if you think your medicine is not helping or if you have side effects  Tell him or her if you are allergic to any medicine  Keep a list of the medicines, vitamins, and herbs you take  Include the amounts, and when and why you take them  Bring the list or the pill bottles to follow-up visits  Carry your medicine list with you in case of an emergency  Self-care:   · Rinse your sinuses  Use a sinus rinse device to rinse your nasal passages with a saline (salt water) solution  This will help thin the mucus in your nose and rinse away pollen and dirt  It will also help reduce swelling so you can breathe normally  Ask your healthcare provider how often to do this  · Breathe in steam   Heat a bowl of water until you see steam  Lean over the bowl and make a tent over your head with a large towel  Breathe deeply for about 20 minutes  Be careful not to get too close to the steam or burn yourself  Do this 3 times a day  You can also breathe deeply when you take a hot shower  · Sleep with your head elevated  Place an extra pillow under your head before you go to sleep to help your sinuses drain  · Drink liquids as directed  Ask your healthcare provider how much liquid to drink each day and which liquids are best for you  Liquids will thin the mucus in your nose and help it drain  Avoid drinks that contain alcohol or caffeine  · Do not smoke, and avoid secondhand smoke    Nicotine and other chemicals in cigarettes and cigars can make your symptoms worse  Ask your healthcare provider for information if you currently smoke and need help to quit  E-cigarettes or smokeless tobacco still contain nicotine  Talk to your healthcare provider before you use these products  Follow up with your healthcare provider as directed: Follow up if your symptoms are worse or not better after 3 to 5 days of treatment  Write down your questions so you remember to ask them during your visits  © 2017 [facility] [place] Information is for End User's use only and may not be sold, redistributed or otherwise used for commercial purposes  All illustrations and images included in CareNotes® are the copyrighted property of [employer]  or [employer]  The above information is an  only  It is not intended as medical advice for individual conditions or treatments  Talk to your doctor, nurse or pharmacist before following any medical regimen to see if it is safe and effective for you

## 2019-03-03 NOTE — PROGRESS NOTES
[facility]        NAME: Jj Frost is a 43 y o  female  : 1980    MRN: [id number]  DATE: 2019  TIME: 7:04 PM    Assessment and Plan   Acute maxillary sinusitis, recurrence not specified [J01 00]  1  Acute maxillary sinusitis, recurrence not specified  azithromycin (ZITHROMAX) 250 mg tablet         Patient Instructions       Follow up with PCP in 3-5 days  Proceed to  ER if symptoms worsen  Chief Complaint     Chief Complaint   Patient presents with    Cold Like Symptoms     1 week         History of Present Illness       Patient with sinus pain and pressure for the past week  Complaining of facial pain  Also severe congestion  Describes occasional greenish nasal discharge  She is taking some over-the-counter Sudafed with mild relief  However, recently developed temperature fever, between 99-99 2  Taking Tylenol for fever  Also describes postnasal drip  Review of Systems   Review of Systems   Constitutional: Positive for fever  HENT: Positive for congestion and sinus pressure  Respiratory: Negative            Current Medications       Current Outpatient Medications:     azithromycin (ZITHROMAX) 250 mg tablet, Take 2 tablets today then 1 tablet daily x 4 days, Disp: 6 tablet, Rfl: 0    budesonide (RINOCORT AQUA) 32 MCG/ACT nasal spray, 1 spray into each nostril daily, Disp: 1 Bottle, Rfl: 3    IRON PO, Take 1 tablet by mouth, Disp: , Rfl:     montelukast (SINGULAIR) 10 mg tablet, TAKE 1 TABLET DAILY FOR ALLERGY, Disp: 30 tablet, Rfl: 0    Current Allergies     Allergies as of 2019 - Reviewed 2019   Allergen Reaction Noted    Penicillins Rash and Shortness Of Breath 2013            The following portions of the patient's history were reviewed and updated as appropriate: allergies, current medications, past family history, past medical history, past social history, past surgical history and problem list      Past Medical History:   Diagnosis Date    Biliary dyskinesia     US done 1/27/14 Sludge  HIDA done 2/12/14 EF 19%  Last assessed: 3/4/14    Cellulitis     Gallbladder disease     Last assessed: 3/4/14       Past Surgical History:   Procedure Laterality Date    CHOLECYSTECTOMY  03/20/2014    Laparoscopic  Cristiane Ohara    WISDOM TOOTH EXTRACTION Bilateral        Family History   Problem Relation Age of Onset    Arthritis Mother     Diabetes Mother         Mellitus    Heart attack Father         Myocardial Infarction    Alcohol abuse Father     Coronary artery disease Father     Heart disease Father          Medications have been verified  Objective   /87   Pulse 81   Temp 97 9 °F (36 6 °C)   Resp 20   Ht 5' 5" (1 651 m)   Wt 78 9 kg (174 lb)   SpO2 97%   BMI 28 96 kg/m²        Physical Exam     Physical Exam   HENT:   Mouth/Throat: Oropharynx is clear and moist    Hypertrophic right turbinates  Maxillary sinus tenderness  Pulmonary/Chest: Effort normal and breath sounds normal    Lymphadenopathy:     She has cervical adenopathy  Nursing note and vitals reviewed

## 2019-03-25 ENCOUNTER — OFFICE VISIT (OUTPATIENT)
Dept: URGENT CARE | Facility: MEDICAL CENTER | Age: 39
End: 2019-03-25
Payer: COMMERCIAL

## 2019-03-25 VITALS
DIASTOLIC BLOOD PRESSURE: 81 MMHG | HEART RATE: 95 BPM | BODY MASS INDEX: 28.99 KG/M2 | OXYGEN SATURATION: 100 % | HEIGHT: 65 IN | SYSTOLIC BLOOD PRESSURE: 146 MMHG | RESPIRATION RATE: 16 BRPM | TEMPERATURE: 99.2 F | WEIGHT: 174 LBS

## 2019-03-25 DIAGNOSIS — Z20.818 STREP THROAT EXPOSURE: Primary | ICD-10-CM

## 2019-03-25 PROCEDURE — 99213 OFFICE O/P EST LOW 20 MIN: CPT | Performed by: PHYSICIAN ASSISTANT

## 2019-03-25 RX ORDER — AZITHROMYCIN 250 MG/1
TABLET, FILM COATED ORAL
Qty: 6 TABLET | Refills: 0 | Status: SHIPPED | OUTPATIENT
Start: 2019-03-25 | End: 2019-03-29

## 2019-03-25 RX ORDER — FLUTICASONE PROPIONATE 50 MCG
1 SPRAY, SUSPENSION (ML) NASAL DAILY
Qty: 16 G | Refills: 0 | Status: SHIPPED | OUTPATIENT
Start: 2019-03-25 | End: 2020-02-20

## 2019-03-25 NOTE — PROGRESS NOTES
[facility]        NAME: Mell Peterson is a 43 y o  female  : 1980    MRN: [id number]  DATE: 2019  TIME: 4:13 PM    Assessment and Plan   Strep throat exposure [Z20 818]  1  Strep throat exposure  azithromycin (ZITHROMAX) 250 mg tablet    fluticasone (FLONASE) 50 mcg/act nasal spray         Patient Instructions       Follow up with PCP in 3-5 days  Proceed to  ER if symptoms worsen  Chief Complaint     Chief Complaint   Patient presents with    Cold Like Symptoms     head congestion for 2 days         History of Present Illness       Sore Throat    This is a new problem  Episode onset: Two days ago  The problem has been gradually worsening  Neither side of throat is experiencing more pain than the other  The maximum temperature recorded prior to her arrival was 100 4 - 100 9 F  The fever has been present for 1 to 2 days  The pain is moderate  Associated symptoms include congestion and swollen glands  Pertinent negatives include no coughing, diarrhea, drooling, ear pain, headaches, neck pain, shortness of breath or vomiting  She has had exposure to strep  Exposure to: Both patient's  and child were positive for strep this week    She has tried NSAIDs (Sudafed) for the symptoms  The treatment provided mild relief  Review of Systems   Review of Systems   Constitutional: Negative for chills, diaphoresis, fatigue and fever  HENT: Positive for congestion, postnasal drip, sinus pressure and sore throat  Negative for drooling, ear pain, rhinorrhea, sinus pain, sneezing and voice change  Eyes: Negative  Respiratory: Negative for cough, chest tightness, shortness of breath and wheezing  Cardiovascular: Negative for chest pain and palpitations  Gastrointestinal: Negative for constipation, diarrhea, nausea and vomiting  Endocrine: Negative  Genitourinary: Negative for dysuria  Musculoskeletal: Negative for back pain, myalgias and neck pain     Skin: Negative for pallor and rash  Allergic/Immunologic: Negative  Neurological: Negative for dizziness, syncope and headaches  Hematological: Negative  Psychiatric/Behavioral: Negative  Current Medications       Current Outpatient Medications:     azithromycin (ZITHROMAX) 250 mg tablet, Take 2 tablets today then 1 tablet daily x 4 days, Disp: 6 tablet, Rfl: 0    fluticasone (FLONASE) 50 mcg/act nasal spray, 1 spray into each nostril daily, Disp: 16 g, Rfl: 0    IRON PO, Take 1 tablet by mouth, Disp: , Rfl:     montelukast (SINGULAIR) 10 mg tablet, TAKE 1 TABLET DAILY FOR ALLERGY (Patient not taking: Reported on 3/25/2019), Disp: 30 tablet, Rfl: 0    Current Allergies     Allergies as of 03/25/2019 - Reviewed 03/25/2019   Allergen Reaction Noted    Penicillins Rash and Shortness Of Breath 03/04/2013            The following portions of the patient's history were reviewed and updated as appropriate: allergies, current medications, past family history, past medical history, past social history, past surgical history and problem list      Past Medical History:   Diagnosis Date    Biliary dyskinesia     US done 1/27/14 Sludge  HIDA done 2/12/14 EF 19%  Last assessed: 3/4/14    Cellulitis     Gallbladder disease     Last assessed: 3/4/14       Past Surgical History:   Procedure Laterality Date    CHOLECYSTECTOMY  03/20/2014    Laparoscopic  Porfirio Santiaog    HERNIA REPAIR      WISDOM TOOTH EXTRACTION Bilateral        Family History   Problem Relation Age of Onset    Arthritis Mother     Diabetes Mother         Mellitus    Heart attack Father         Myocardial Infarction    Alcohol abuse Father     Coronary artery disease Father     Heart disease Father          Medications have been verified          Objective   /81   Pulse 95   Temp 99 2 °F (37 3 °C) (Tympanic)   Resp 16   Ht 5' 5" (1 651 m)   Wt 78 9 kg (174 lb)   LMP 03/24/2019   SpO2 100%   BMI 28 96 kg/m²        Physical Exam Physical Exam   Constitutional: She appears well-developed and well-nourished  No distress  HENT:   Head: Normocephalic and atraumatic  Right Ear: Hearing, tympanic membrane, external ear and ear canal normal    Left Ear: Hearing, tympanic membrane, external ear and ear canal normal    Nose: Mucosal edema and rhinorrhea present  Mouth/Throat: Posterior oropharyngeal erythema present  No oropharyngeal exudate or posterior oropharyngeal edema  Eyes: Conjunctivae are normal  Right eye exhibits no discharge  Left eye exhibits no discharge  Neck: Normal range of motion  Neck supple  Cardiovascular: Normal rate, regular rhythm, normal heart sounds and intact distal pulses  Pulmonary/Chest: Effort normal and breath sounds normal  No respiratory distress  She has no wheezes  She has no rales  Lymphadenopathy:     She has no cervical adenopathy  Skin: Skin is warm  Capillary refill takes less than 2 seconds  No rash noted  She is not diaphoretic  Nursing note and vitals reviewed

## 2019-03-25 NOTE — PATIENT INSTRUCTIONS
Take medications as directed  Drink plenty of fluids  Follow up with family doctor this week  Go to ER immediately if new or worsening symptoms occur  Strep Throat   WHAT YOU NEED TO KNOW:   Strep throat is a throat infection caused by bacteria  It is easily spread from person to person  DISCHARGE INSTRUCTIONS:   Call 911 for any of the following:   · You have trouble breathing  Return to the emergency department if:   · You have new symptoms like a bad headache, stiff neck, chest pain, or vomiting  · You are drooling because you cannot swallow your spit  Contact your healthcare provider if:   · You have a fever  · You have a rash or ear pain  · You have green, yellow-brown, or bloody mucus when you cough or blow your nose  · You are unable to drink anything  · You have questions or concerns about your condition or care  Medicines:   · Antibiotics  help treat your strep throat  You should feel better within 2 to 3 days after you start antibiotics  · Take your medicine as directed  Contact your healthcare provider if you think your medicine is not helping or if you have side effects  Tell him or her if you are allergic to any medicine  Keep a list of the medicines, vitamins, and herbs you take  Include the amounts, and when and why you take them  Bring the list or the pill bottles to follow-up visits  Carry your medicine list with you in case of an emergency  Manage your symptoms:   · Use lozenges, ice, soft foods, or popsicles  to soothe your throat  · Drink juice, milk shakes, or soup  if your throat is too sore to eat solid food  Drinking liquids can also help prevent dehydration  · Gargle with salt water  Mix ¼ teaspoon salt in a glass of warm water and gargle  This may help reduce swelling in your throat  · Do not smoke  Nicotine and other chemicals in cigarettes and cigars can cause lung damage and make your symptoms worse   Ask your healthcare provider for information if you currently smoke and need help to quit  E-cigarettes or smokeless tobacco still contain nicotine  Talk to your healthcare provider before you use these products  Return to work or school  24 hours after you start antibiotic medicine  Prevent the spread of strep throat:   · Wash your hands often  Use soap and water  Wash your hands after you use the bathroom, change a child's diapers, or sneeze  Wash your hands before you prepare or eat food  · Do not share food or drinks  Replace your toothbrush after you have taken antibiotics for 24 hours  Follow up with your healthcare provider as directed:  Write down your questions so you remember to ask them during your visits  © 2017 [employer] Information is for End User's use only and may not be sold, redistributed or otherwise used for commercial purposes  All illustrations and images included in CareNotes® are the copyrighted property of [employer]  or [employer]  The above information is an  only  It is not intended as medical advice for individual conditions or treatments  Talk to your doctor, nurse or pharmacist before following any medical regimen to see if it is safe and effective for you

## 2019-04-07 ENCOUNTER — APPOINTMENT (OUTPATIENT)
Dept: RADIOLOGY | Facility: MEDICAL CENTER | Age: 39
End: 2019-04-07
Payer: COMMERCIAL

## 2019-04-07 ENCOUNTER — OFFICE VISIT (OUTPATIENT)
Dept: URGENT CARE | Facility: MEDICAL CENTER | Age: 39
End: 2019-04-07
Payer: COMMERCIAL

## 2019-04-07 ENCOUNTER — TRANSCRIBE ORDERS (OUTPATIENT)
Dept: URGENT CARE | Facility: MEDICAL CENTER | Age: 39
End: 2019-04-07

## 2019-04-07 VITALS
RESPIRATION RATE: 20 BRPM | WEIGHT: 174 LBS | HEIGHT: 65 IN | TEMPERATURE: 96 F | SYSTOLIC BLOOD PRESSURE: 137 MMHG | BODY MASS INDEX: 28.99 KG/M2 | DIASTOLIC BLOOD PRESSURE: 69 MMHG | HEART RATE: 82 BPM | OXYGEN SATURATION: 99 %

## 2019-04-07 DIAGNOSIS — S99.921A FOOT INJURY, RIGHT, INITIAL ENCOUNTER: Primary | ICD-10-CM

## 2019-04-07 DIAGNOSIS — S99.921A FOOT INJURY, RIGHT, INITIAL ENCOUNTER: ICD-10-CM

## 2019-04-07 DIAGNOSIS — S93.601A SPRAIN OF RIGHT FOOT, INITIAL ENCOUNTER: Primary | ICD-10-CM

## 2019-04-07 PROCEDURE — 73630 X-RAY EXAM OF FOOT: CPT

## 2019-04-07 PROCEDURE — 99214 OFFICE O/P EST MOD 30 MIN: CPT | Performed by: FAMILY MEDICINE

## 2020-02-20 ENCOUNTER — OFFICE VISIT (OUTPATIENT)
Dept: URGENT CARE | Facility: MEDICAL CENTER | Age: 40
End: 2020-02-20
Payer: COMMERCIAL

## 2020-02-20 VITALS
DIASTOLIC BLOOD PRESSURE: 74 MMHG | SYSTOLIC BLOOD PRESSURE: 146 MMHG | BODY MASS INDEX: 30.99 KG/M2 | RESPIRATION RATE: 16 BRPM | WEIGHT: 186 LBS | HEART RATE: 84 BPM | OXYGEN SATURATION: 99 % | HEIGHT: 65 IN | TEMPERATURE: 97.7 F

## 2020-02-20 DIAGNOSIS — J06.9 ACUTE URI: Primary | ICD-10-CM

## 2020-02-20 DIAGNOSIS — H69.81 DYSFUNCTION OF RIGHT EUSTACHIAN TUBE: ICD-10-CM

## 2020-02-20 PROCEDURE — 99213 OFFICE O/P EST LOW 20 MIN: CPT | Performed by: FAMILY MEDICINE

## 2020-02-20 RX ORDER — FLUTICASONE PROPIONATE 50 MCG
2 SPRAY, SUSPENSION (ML) NASAL DAILY
Qty: 16 G | Refills: 0 | Status: SHIPPED | OUTPATIENT
Start: 2020-02-20

## 2020-02-20 NOTE — PROGRESS NOTES
Assessment/Plan    Acute URI [J06 9]  1  Acute URI  fluticasone (FLONASE) 50 mcg/act nasal spray   2  Dysfunction of right eustachian tube  fluticasone (FLONASE) 50 mcg/act nasal spray       Patient education to maintain hand hygiene, rest, and increase fluids  Continue using OTC Sudafed as needed for symptomatic relief and Flonase nasal spray as directed  Follow-up with PCP if symptoms persist  Proceed to ER if symptoms worsen, chest pain, or difficulty breathing  Subjective:     Patient ID: Neymar Butcher is a 35 y o  female  Reason For Visit / Chief Complaint  Chief Complaint   Patient presents with    Cold Like Symptoms     Patient presents with sinus congestion, pressure, and runny nose for about 5 days  She has used dayquil and sudafed for her symptoms  She denies fever  35 y o  [de-identified] F with history of eustachian tube dysfunction presents with nasal congestion and sinus pressure x 5 days  She has associated clear rhinorrhea, postnasal drip, and sensation of ear "popping" with swallowing  She notes symptoms are gradually improving from yesterday  She tried one dose of OTC pseudoephedrine (Sudafed) with moderate relief and has been using OTC Dayquil with moderate relief  She is up to date with the flu vaccine this year  Sick contact includes daughter with cold-like symptoms  Denies recent travel  Denies fever, chills, myalgias, sore throat, cough, SOB, difficulty breathing, abdominal pain, nausea, vomiting, or diarrhea  Denies tobacco use or exposure  Allergy to penicillins causing hives  Past Medical History:   Diagnosis Date    Biliary dyskinesia     US done 1/27/14 Sludge  HIDA done 2/12/14 EF 19%  Last assessed: 3/4/14    Cellulitis     Gallbladder disease     Last assessed: 3/4/14       Past Surgical History:   Procedure Laterality Date    CHOLECYSTECTOMY  03/20/2014    Laparoscopic   Candance Ly HERNIA REPAIR      WISDOM TOOTH EXTRACTION Bilateral        Family History   Problem Relation Age of Onset    Arthritis Mother     Diabetes Mother         Mellitus    Heart attack Father         Myocardial Infarction    Alcohol abuse Father     Coronary artery disease Father     Heart disease Father        Review of Systems   Constitutional: Positive for fatigue  Negative for activity change, appetite change, chills, diaphoresis and fever  HENT: Positive for congestion, postnasal drip, rhinorrhea and sinus pressure  Negative for ear discharge, ear pain, hearing loss, sinus pain, sneezing, sore throat, trouble swallowing and voice change  Respiratory: Negative for apnea, cough, choking, chest tightness, shortness of breath, wheezing and stridor  Cardiovascular: Negative for chest pain  Gastrointestinal: Negative for abdominal pain, diarrhea, nausea and vomiting  Musculoskeletal: Negative for myalgias  Objective:    /74   Pulse 84   Temp 97 7 °F (36 5 °C) (Tympanic)   Resp 16   Ht 5' 5" (1 651 m)   Wt 84 4 kg (186 lb)   LMP 02/08/2020   SpO2 99%   BMI 30 95 kg/m²     Physical Exam   Constitutional: She appears well-developed and well-nourished  Non-toxic appearance  She does not have a sickly appearance  She does not appear ill  No distress  HENT:   Head: Normocephalic and atraumatic  Right Ear: Tympanic membrane, external ear and ear canal normal  No tenderness  Tympanic membrane is not erythematous and not bulging  No middle ear effusion  Left Ear: Tympanic membrane, external ear and ear canal normal  No tenderness  Tympanic membrane is not erythematous and not bulging  No middle ear effusion  Nose: Mucosal edema and rhinorrhea present  Right sinus exhibits no frontal sinus tenderness  Left sinus exhibits no frontal sinus tenderness  Mouth/Throat: Uvula is midline, oropharynx is clear and moist and mucous membranes are normal  Tonsils are 0 on the right  Tonsils are 0 on the left  No tonsillar exudate     +hypertrophic nasal turbinates R>L   Eyes: Pupils are equal, round, and reactive to light  Cardiovascular: Normal rate, regular rhythm and normal heart sounds  Pulmonary/Chest: Effort normal and breath sounds normal    Abdominal: Soft  Lymphadenopathy:     She has no cervical adenopathy

## 2020-02-20 NOTE — PATIENT INSTRUCTIONS
I prescribed fluticasone spray -2 sprays in each nostril once daily  Patient may continue with OTC Sudafed as needed for nasal congestion and pressure  Follow up with PCP symptoms persist or worsen  Eustachian Tube Dysfunction   WHAT YOU NEED TO KNOW:   What is eustachian tube dysfunction? Eustachian tube dysfunction (ETD) is a condition that prevents your eustachian tubes from opening properly  It can also cause them to become blocked  Eustachian tubes connect your middle ear to the back of your nose and throat  These tubes open and allow air to flow in and out when you sneeze, swallow, or yawn  What causes or increases my risk of ETD? ETD may be caused by swelling or buildup of mucus in your eustachian tubes  Allergies, a cold, or sinus infection can increase your risk for ETD  Smoking also increases your risk for ETD  What are the signs and symptoms of ETD? · Fullness or pressure in your ears    · Muffled hearing    · Pain in one or both ears    · Ringing in your ears    · Popping or clicking feeling in your ears    · Trouble keeping your balance  How is ETD diagnosed? Your healthcare provider will ask about your symptoms  He will examine your ears, your nose, and the back of your throat  He may also do a hearing test    How is ETD treated? Your ETD may get better on its own without any treatment  You may need any of the following:  · Exercises  such as swallowing, yawning, or chewing gum may help to open your eustachian tubes  Your healthcare provider may also recommend that you take a deep breath and then blow with your mouth shut and your nostrils pinched closed  · Air pressure devices  push air into your nose and eustachian tubes to help relieve air pressure in your ear  · Treatment for allergies  such as decongestants, antihistamines, and nasal steroids may improve ETD  They may help decrease swelling of the eustachian tubes  · Ear tubes  may help to keep your middle ear open   During this procedure, your healthcare provider will cut a small hole in your eardrum  · A myringotomy  is procedure to make a small cut in your eardrum and suction out fluid from your middle ear  · Tuboplasty  is a procedure to widen your eustachian tubes  When should I contact my healthcare provider? · Your symptoms do not improve or get worse  · You have a fever  · You have any hearing loss  · You have questions or concerns about your condition or care  CARE AGREEMENT:   You have the right to help plan your care  Learn about your health condition and how it may be treated  Discuss treatment options with your caregivers to decide what care you want to receive  You always have the right to refuse treatment  The above information is an  only  It is not intended as medical advice for individual conditions or treatments  Talk to your doctor, nurse or pharmacist before following any medical regimen to see if it is safe and effective for you  © 2017 [employer]  Information is for End User's use only and may not be sold, redistributed or otherwise used for commercial purposes  All illustrations and images included in CareNotes® are the copyrighted property of [employer] M , Inc  or [employer]

## 2020-02-20 NOTE — PROGRESS NOTES
[facility]        NAME: Michael Barrera is a 35 y o  female  : 1980    MRN: [id number]  DATE: 2020  TIME: 1:51 PM    Assessment and Plan   Acute URI [J06 9]  1  Acute URI  fluticasone (FLONASE) 50 mcg/act nasal spray   2  Dysfunction of right eustachian tube  fluticasone (FLONASE) 50 mcg/act nasal spray         Patient Instructions       Follow up with PCP in 3-5 days  Proceed to  ER if symptoms worsen  Chief Complaint     Chief Complaint   Patient presents with    Cold Like Symptoms     Patient presents with sinus congestion, pressure, and runny nose for about 5 days  She has used dayquil and sudafed for her symptoms  She denies fever  History of Present Illness       79-year-old female here today with 4 day history URI symptoms  Symptoms consist of nasal congestion mild to moderate, some sinus pressure  Also complaining fullness pressure in the ears right greater than left  Occasional popping  Denies purulent discharge  Denies fever  Review of Systems   Review of Systems   Constitutional: Negative  HENT: Positive for congestion, ear pain and sinus pressure  Respiratory: Positive for cough  Current Medications       Current Outpatient Medications:     fluticasone (FLONASE) 50 mcg/act nasal spray, 2 sprays into each nostril daily, Disp: 16 g, Rfl: 0    Current Allergies     Allergies as of 2020 - Reviewed 2020   Allergen Reaction Noted    Penicillins Rash and Shortness Of Breath 2013            The following portions of the patient's history were reviewed and updated as appropriate: allergies, current medications, past family history, past medical history, past social history, past surgical history and problem list      Past Medical History:   Diagnosis Date    Biliary dyskinesia     US done 14 Sludge  HIDA done 14 EF 19%   Last assessed: 3/4/14    Cellulitis     Gallbladder disease     Last assessed: 3/4/14 Past Surgical History:   Procedure Laterality Date    CHOLECYSTECTOMY  03/20/2014    Laparoscopic  Alvah Felty    HERNIA REPAIR      WISDOM TOOTH EXTRACTION Bilateral        Family History   Problem Relation Age of Onset    Arthritis Mother     Diabetes Mother         Mellitus    Heart attack Father         Myocardial Infarction    Alcohol abuse Father     Coronary artery disease Father     Heart disease Father          Medications have been verified  Objective   /74   Pulse 84   Temp 97 7 °F (36 5 °C) (Tympanic)   Resp 16   Ht 5' 5" (1 651 m)   Wt 84 4 kg (186 lb)   LMP 02/08/2020   SpO2 99%   BMI 30 95 kg/m²        Physical Exam     Physical Exam   Constitutional: She appears well-developed  HENT:   Right Ear: External ear normal    Left Ear: External ear normal    Hypertrophic erythematous right turbinates  Eyes: Pupils are equal, round, and reactive to light  Neck: Normal range of motion  Neck supple  Pulmonary/Chest: Effort normal and breath sounds normal    Vitals reviewed

## 2020-03-03 ENCOUNTER — OFFICE VISIT (OUTPATIENT)
Dept: URGENT CARE | Facility: CLINIC | Age: 40
End: 2020-03-03
Payer: COMMERCIAL

## 2020-03-03 VITALS
SYSTOLIC BLOOD PRESSURE: 128 MMHG | HEART RATE: 68 BPM | RESPIRATION RATE: 18 BRPM | OXYGEN SATURATION: 98 % | DIASTOLIC BLOOD PRESSURE: 82 MMHG | TEMPERATURE: 98.2 F | WEIGHT: 186 LBS | HEIGHT: 65 IN | BODY MASS INDEX: 30.99 KG/M2

## 2020-03-03 DIAGNOSIS — H69.83 DYSFUNCTION OF BOTH EUSTACHIAN TUBES: Primary | ICD-10-CM

## 2020-03-03 DIAGNOSIS — J01.90 ACUTE SINUSITIS, RECURRENCE NOT SPECIFIED, UNSPECIFIED LOCATION: ICD-10-CM

## 2020-03-03 PROCEDURE — 99213 OFFICE O/P EST LOW 20 MIN: CPT | Performed by: PHYSICIAN ASSISTANT

## 2020-03-03 RX ORDER — AZITHROMYCIN 250 MG/1
TABLET, FILM COATED ORAL
Qty: 6 TABLET | Refills: 0 | Status: SHIPPED | OUTPATIENT
Start: 2020-03-03 | End: 2020-03-07

## 2020-03-03 NOTE — PROGRESS NOTES
[employer]    NAME: Noemi Pitts is a 35 y o  female  : 1980    MRN: [id number]  DATE: March 3, 2020  TIME: 6:04 PM    Assessment and Plan   Dysfunction of both eustachian tubes [H69 83]  1  Dysfunction of both eustachian tubes     2  Acute sinusitis, recurrence not specified, unspecified location  azithromycin (ZITHROMAX) 250 mg tablet     [facility] Information given to pt / caregiver on "Virus or Bacteria" / Be antibiotic aware  Patient Instructions   Patient Instructions   This may still be related to viral illness however due to length of symptoms and your upcoming trip will try to see if antibiotic will be helpful  It does appear that you have a slight deviation of your nasal septum and that may keep sinuses from draining efficiently  Tylenol or ibuprofen (if allowed) as needed for any ear discomfort, sore throat, fever, body aches and / or pains  Cough drops, throat lozenges as needed  Vaporizer  Fluids  Rest     You may use over the counter cough / cold medication as directed  This provider likes Mucinex D 12 Hour Formula:  1/2 to 1 tablet twice a day with full glass of fluid for daytime symptom relief (unless on blood pressure medicine, then  Coricidin HP is recommended)  You may continue your Flonase  Nasal saline spray may be helpful to moisturize and rinse nose / sinuses  Use every 1-2 hours while awake  May use nighttime cough medicine  Provider likes Delsym or generic equivalent  May cause drowsiness therefore recommend home use only  Call your primary care doctor's office as soon as possible to arrange follow-up evaluation for 7-10 days  If you're not improved you will have an appointment that you can seek further evaluation  If you are better you can always call and cancel  Chief Complaint     Chief Complaint   Patient presents with    Nasal Congestion     with bilateral ear pressure and "popping" (R>L)   Was seen here on  and has followed the regimen of NSS and flonase (no decongestant) since then  Is flying opn 3/12 and has concerns re ears  History of Present Illness   Michael Armando presents to the clinic c/o  69-year-old female that comes in here with persistent discomfort in her ears with popping sensation  She has had runny nose, nasal congestion and postnasal drip  She has been doing Sudafed common nasal saline spray, Flonase  She was seen over the [facility] on February 20th and does not feel like she is doing any better  She does have a 1year-old at home who is in  and is constantly coming home with respiratory illnesses  Patient will be going out of town in about 10 days and is concerned about having ear problems while traveling  She reports history of sinus infections  Review of Systems   Review of Systems   Constitutional: Negative  HENT: Positive for congestion, ear pain, postnasal drip, rhinorrhea, sinus pressure and sore throat  Negative for ear discharge and sneezing  Eyes: Negative  Respiratory: Negative  Cardiovascular: Negative  Current Medications     Long-Term Medications   Medication Sig Dispense Refill    fluticasone (FLONASE) 50 mcg/act nasal spray 2 sprays into each nostril daily 16 g 0       Current Allergies     Allergies as of 03/03/2020 - Reviewed 03/03/2020   Allergen Reaction Noted    Penicillins Rash and Shortness Of Breath 03/04/2013          The following portions of the patient's history were reviewed and updated as appropriate: allergies, current medications, past family history, past medical history, past social history, past surgical history and problem list   Past Medical History:   Diagnosis Date    Biliary dyskinesia     US done 1/27/14 Sludge  HIDA done 2/12/14 EF 19%  Last assessed: 3/4/14    Cellulitis     Gallbladder disease     Last assessed: 3/4/14     Past Surgical History:   Procedure Laterality Date    CHOLECYSTECTOMY  03/20/2014    Laparoscopic  Alvah Felty    HERNIA REPAIR      WISDOM TOOTH EXTRACTION Bilateral      Family History   Problem Relation Age of Onset    Arthritis Mother     Diabetes Mother         Mellitus    Heart attack Father         Myocardial Infarction    Alcohol abuse Father     Coronary artery disease Father     Heart disease Father        Objective   /82   Pulse 68   Temp 98 2 °F (36 8 °C)   Resp 18   Ht 5' 5" (1 651 m)   Wt 84 4 kg (186 lb)   LMP 02/08/2020   SpO2 98%   BMI 30 95 kg/m²   Patient's last menstrual period was 02/08/2020  Physical Exam     Physical Exam   Constitutional: She is oriented to person, place, and time  She appears well-developed and well-nourished  No distress  HENT:   Head: Normocephalic and atraumatic  Right Ear: External ear normal    Left Ear: External ear normal    Mouth/Throat: No oropharyngeal exudate  Cobblestoning of posterior pharynx with patchy redness  No exudate or fetid breath  Nasal turbinates red and edematous  No purulent mucus  Significantly retracted EACs right greater than left  Slight nasal septal deviation noted  Eyes: Pupils are equal, round, and reactive to light  Conjunctivae and EOM are normal  Right eye exhibits no discharge  Left eye exhibits no discharge  Neck: Normal range of motion  Neck supple  Cardiovascular: Normal rate, regular rhythm and normal heart sounds  Exam reveals no gallop and no friction rub  No murmur heard  Pulmonary/Chest: Effort normal and breath sounds normal  No stridor  No respiratory distress  She has no wheezes  She has no rales  Lymphadenopathy:     She has no cervical adenopathy  Neurological: She is alert and oriented to person, place, and time  Skin: Skin is warm and dry  No rash noted  She is not diaphoretic  Psychiatric: She has a normal mood and affect  Nursing note and vitals reviewed

## 2020-03-03 NOTE — PATIENT INSTRUCTIONS
This may still be related to viral illness however due to length of symptoms and your upcoming trip will try to see if antibiotic will be helpful  It does appear that you have a slight deviation of your nasal septum and that may keep sinuses from draining efficiently  Tylenol or ibuprofen (if allowed) as needed for any ear discomfort, sore throat, fever, body aches and / or pains  Cough drops, throat lozenges as needed  Vaporizer  Fluids  Rest     You may use over the counter cough / cold medication as directed  This provider likes Mucinex D 12 Hour Formula:  1/2 to 1 tablet twice a day with full glass of fluid for daytime symptom relief (unless on blood pressure medicine, then  Coricidin HP is recommended)  You may continue your Flonase  Nasal saline spray may be helpful to moisturize and rinse nose / sinuses  Use every 1-2 hours while awake  May use nighttime cough medicine  Provider likes Delsym or generic equivalent  May cause drowsiness therefore recommend home use only  Call your primary care doctor's office as soon as possible to arrange follow-up evaluation for 7-10 days  If you're not improved you will have an appointment that you can seek further evaluation  If you are better you can always call and cancel

## 2020-11-05 ENCOUNTER — TELEPHONE (OUTPATIENT)
Dept: FAMILY MEDICINE CLINIC | Facility: CLINIC | Age: 40
End: 2020-11-05

## 2021-03-10 DIAGNOSIS — Z23 ENCOUNTER FOR IMMUNIZATION: ICD-10-CM
